# Patient Record
Sex: FEMALE | Race: WHITE | NOT HISPANIC OR LATINO | URBAN - METROPOLITAN AREA
[De-identification: names, ages, dates, MRNs, and addresses within clinical notes are randomized per-mention and may not be internally consistent; named-entity substitution may affect disease eponyms.]

---

## 2018-04-19 ENCOUNTER — INPATIENT (INPATIENT)
Facility: HOSPITAL | Age: 33
LOS: 0 days | Discharge: HOME | DRG: 812 | End: 2018-04-20
Attending: OBSTETRICS & GYNECOLOGY | Admitting: OBSTETRICS & GYNECOLOGY
Payer: COMMERCIAL

## 2018-04-19 ENCOUNTER — EMERGENCY (EMERGENCY)
Facility: HOSPITAL | Age: 33
LOS: 1 days | Discharge: HOME | End: 2018-04-19
Attending: EMERGENCY MEDICINE | Admitting: EMERGENCY MEDICINE
Payer: COMMERCIAL

## 2018-04-19 VITALS
DIASTOLIC BLOOD PRESSURE: 54 MMHG | SYSTOLIC BLOOD PRESSURE: 100 MMHG | TEMPERATURE: 98 F | HEART RATE: 76 BPM | RESPIRATION RATE: 18 BRPM | OXYGEN SATURATION: 100 %

## 2018-04-19 VITALS
RESPIRATION RATE: 18 BRPM | SYSTOLIC BLOOD PRESSURE: 68 MMHG | OXYGEN SATURATION: 100 % | HEART RATE: 69 BPM | TEMPERATURE: 98 F | DIASTOLIC BLOOD PRESSURE: 41 MMHG

## 2018-04-19 VITALS
OXYGEN SATURATION: 100 % | RESPIRATION RATE: 18 BRPM | TEMPERATURE: 99 F | HEART RATE: 86 BPM | DIASTOLIC BLOOD PRESSURE: 57 MMHG | SYSTOLIC BLOOD PRESSURE: 95 MMHG | WEIGHT: 115.08 LBS | HEIGHT: 64 IN

## 2018-04-19 DIAGNOSIS — I95.9 HYPOTENSION, UNSPECIFIED: ICD-10-CM

## 2018-04-19 DIAGNOSIS — Z97.5 PRESENCE OF (INTRAUTERINE) CONTRACEPTIVE DEVICE: Chronic | ICD-10-CM

## 2018-04-19 DIAGNOSIS — N98.1 HYPERSTIMULATION OF OVARIES: ICD-10-CM

## 2018-04-19 LAB
ALBUMIN SERPL ELPH-MCNC: 2.7 G/DL — LOW (ref 3.4–5)
ALP SERPL-CCNC: 25 U/L — LOW (ref 40–120)
ALT FLD-CCNC: 19 U/L — SIGNIFICANT CHANGE UP (ref 12–42)
ANION GAP SERPL CALC-SCNC: 8 MMOL/L — LOW (ref 9–16)
APTT BLD: 20 SEC — LOW (ref 27.5–36.5)
AST SERPL-CCNC: 35 U/L — SIGNIFICANT CHANGE UP (ref 15–37)
BASOPHILS NFR BLD AUTO: 0.1 % — SIGNIFICANT CHANGE UP (ref 0–2)
BILIRUB SERPL-MCNC: 0.3 MG/DL — SIGNIFICANT CHANGE UP (ref 0.2–1.2)
BLD GP AB SCN SERPL QL: NEGATIVE — SIGNIFICANT CHANGE UP
BUN SERPL-MCNC: 16 MG/DL — SIGNIFICANT CHANGE UP (ref 7–23)
CALCIUM SERPL-MCNC: 7.7 MG/DL — LOW (ref 8.5–10.5)
CHLORIDE SERPL-SCNC: 106 MMOL/L — SIGNIFICANT CHANGE UP (ref 96–108)
CO2 SERPL-SCNC: 23 MMOL/L — SIGNIFICANT CHANGE UP (ref 22–31)
CREAT SERPL-MCNC: 0.47 MG/DL — LOW (ref 0.5–1.3)
EOSINOPHIL NFR BLD AUTO: 0 % — SIGNIFICANT CHANGE UP (ref 0–6)
GLUCOSE SERPL-MCNC: 168 MG/DL — HIGH (ref 70–99)
HCG SERPL-ACNC: <1 MIU/ML — SIGNIFICANT CHANGE UP
HCT VFR BLD CALC: 20.1 % — CRITICAL LOW (ref 34.5–45)
HCT VFR BLD CALC: 20.5 % — CRITICAL LOW (ref 34.5–45)
HCT VFR BLD CALC: 20.7 % — CRITICAL LOW (ref 34.5–45)
HGB BLD-MCNC: 6.8 G/DL — CRITICAL LOW (ref 11.5–15.5)
HGB BLD-MCNC: 6.8 G/DL — CRITICAL LOW (ref 11.5–15.5)
HGB BLD-MCNC: 7.1 G/DL — LOW (ref 11.5–15.5)
HYPOCHROMIA BLD QL: SLIGHT — SIGNIFICANT CHANGE UP
INR BLD: 1.11 — SIGNIFICANT CHANGE UP (ref 0.88–1.16)
LACTATE SERPL-SCNC: 1 MMOL/L — SIGNIFICANT CHANGE UP (ref 0.5–2)
LG PLATELETS BLD QL AUTO: PRESENT — SIGNIFICANT CHANGE UP
LYMPHOCYTES # BLD AUTO: 6.9 % — LOW (ref 13–44)
LYMPHOCYTES # BLD AUTO: 7 % — LOW (ref 13–44)
MCHC RBC-ENTMCNC: 30.2 PG — SIGNIFICANT CHANGE UP (ref 27–34)
MCHC RBC-ENTMCNC: 31.8 PG — SIGNIFICANT CHANGE UP (ref 27–34)
MCHC RBC-ENTMCNC: 32.3 PG — SIGNIFICANT CHANGE UP (ref 27–34)
MCHC RBC-ENTMCNC: 33.2 G/DL — SIGNIFICANT CHANGE UP (ref 32–36)
MCHC RBC-ENTMCNC: 33.8 G/DL — SIGNIFICANT CHANGE UP (ref 32–36)
MCHC RBC-ENTMCNC: 34.3 G/DL — SIGNIFICANT CHANGE UP (ref 32–36)
MCV RBC AUTO: 91.1 FL — SIGNIFICANT CHANGE UP (ref 80–100)
MCV RBC AUTO: 93.9 FL — SIGNIFICANT CHANGE UP (ref 80–100)
MCV RBC AUTO: 94.1 FL — SIGNIFICANT CHANGE UP (ref 80–100)
MONOCYTES NFR BLD AUTO: 4.6 % — SIGNIFICANT CHANGE UP (ref 2–14)
NEUTROPHILS NFR BLD AUTO: 73 % — SIGNIFICANT CHANGE UP (ref 43–77)
NEUTROPHILS NFR BLD AUTO: 88.4 % — HIGH (ref 43–77)
NEUTS BAND # BLD: 20 % — HIGH
PLAT MORPH BLD: (no result)
PLATELET # BLD AUTO: 158 K/UL — SIGNIFICANT CHANGE UP (ref 150–400)
PLATELET # BLD AUTO: 162 K/UL — SIGNIFICANT CHANGE UP (ref 150–400)
PLATELET # BLD AUTO: 183 K/UL — SIGNIFICANT CHANGE UP (ref 150–400)
POTASSIUM SERPL-MCNC: 5.2 MMOL/L — SIGNIFICANT CHANGE UP (ref 3.5–5.3)
POTASSIUM SERPL-SCNC: 5.2 MMOL/L — SIGNIFICANT CHANGE UP (ref 3.5–5.3)
PROT SERPL-MCNC: 5.4 G/DL — LOW (ref 6.4–8.2)
PROTHROM AB SERPL-ACNC: 12.2 SEC — SIGNIFICANT CHANGE UP (ref 9.8–12.7)
RBC # BLD: 2.14 M/UL — LOW (ref 3.8–5.2)
RBC # BLD: 2.2 M/UL — LOW (ref 3.8–5.2)
RBC # BLD: 2.25 M/UL — LOW (ref 3.8–5.2)
RBC # FLD: 12 % — SIGNIFICANT CHANGE UP (ref 10.3–16.9)
RBC # FLD: 12.4 % — SIGNIFICANT CHANGE UP (ref 10.3–16.9)
RBC # FLD: 12.9 % — SIGNIFICANT CHANGE UP (ref 10.3–16.9)
RBC BLD AUTO: (no result)
RH IG SCN BLD-IMP: NEGATIVE — SIGNIFICANT CHANGE UP
RH IG SCN BLD-IMP: NEGATIVE — SIGNIFICANT CHANGE UP
SODIUM SERPL-SCNC: 137 MMOL/L — SIGNIFICANT CHANGE UP (ref 132–145)
WBC # BLD: 10.4 K/UL — SIGNIFICANT CHANGE UP (ref 3.8–10.5)
WBC # BLD: 11.7 K/UL — HIGH (ref 3.8–10.5)
WBC # BLD: 9.5 K/UL — SIGNIFICANT CHANGE UP (ref 3.8–10.5)
WBC # FLD AUTO: 10.4 K/UL — SIGNIFICANT CHANGE UP (ref 3.8–10.5)
WBC # FLD AUTO: 11.7 K/UL — HIGH (ref 3.8–10.5)
WBC # FLD AUTO: 9.5 K/UL — SIGNIFICANT CHANGE UP (ref 3.8–10.5)

## 2018-04-19 PROCEDURE — 76775 US EXAM ABDO BACK WALL LIM: CPT | Mod: 26

## 2018-04-19 PROCEDURE — 71045 X-RAY EXAM CHEST 1 VIEW: CPT | Mod: 26

## 2018-04-19 PROCEDURE — 93010 ELECTROCARDIOGRAM REPORT: CPT

## 2018-04-19 PROCEDURE — 76705 ECHO EXAM OF ABDOMEN: CPT | Mod: 26

## 2018-04-19 PROCEDURE — 99291 CRITICAL CARE FIRST HOUR: CPT

## 2018-04-19 PROCEDURE — 76856 US EXAM PELVIC COMPLETE: CPT | Mod: 26

## 2018-04-19 PROCEDURE — 99285 EMERGENCY DEPT VISIT HI MDM: CPT | Mod: 25

## 2018-04-19 RX ORDER — HYDROMORPHONE HYDROCHLORIDE 2 MG/ML
0.5 INJECTION INTRAMUSCULAR; INTRAVENOUS; SUBCUTANEOUS ONCE
Qty: 0 | Refills: 0 | Status: DISCONTINUED | OUTPATIENT
Start: 2018-04-19 | End: 2018-04-19

## 2018-04-19 RX ORDER — SODIUM CHLORIDE 9 MG/ML
1000 INJECTION, SOLUTION INTRAVENOUS
Qty: 0 | Refills: 0 | Status: DISCONTINUED | OUTPATIENT
Start: 2018-04-19 | End: 2018-04-20

## 2018-04-19 RX ORDER — MORPHINE SULFATE 50 MG/1
4 CAPSULE, EXTENDED RELEASE ORAL
Qty: 0 | Refills: 0 | Status: DISCONTINUED | OUTPATIENT
Start: 2018-04-19 | End: 2018-04-20

## 2018-04-19 RX ORDER — ONDANSETRON 8 MG/1
2 TABLET, FILM COATED ORAL ONCE
Qty: 0 | Refills: 0 | Status: DISCONTINUED | OUTPATIENT
Start: 2018-04-19 | End: 2018-04-20

## 2018-04-19 RX ORDER — ACETAMINOPHEN 500 MG
1000 TABLET ORAL ONCE
Qty: 0 | Refills: 0 | Status: COMPLETED | OUTPATIENT
Start: 2018-04-19 | End: 2018-04-19

## 2018-04-19 RX ORDER — SODIUM CHLORIDE 9 MG/ML
1000 INJECTION INTRAMUSCULAR; INTRAVENOUS; SUBCUTANEOUS ONCE
Qty: 0 | Refills: 0 | Status: COMPLETED | OUTPATIENT
Start: 2018-04-19 | End: 2018-04-19

## 2018-04-19 RX ORDER — CEFTRIAXONE 500 MG/1
1 INJECTION, POWDER, FOR SOLUTION INTRAMUSCULAR; INTRAVENOUS ONCE
Qty: 0 | Refills: 0 | Status: DISCONTINUED | OUTPATIENT
Start: 2018-04-19 | End: 2018-04-23

## 2018-04-19 RX ADMIN — Medication 1000 MILLIGRAM(S): at 20:37

## 2018-04-19 RX ADMIN — SODIUM CHLORIDE 1000 MILLILITER(S): 9 INJECTION INTRAMUSCULAR; INTRAVENOUS; SUBCUTANEOUS at 16:17

## 2018-04-19 RX ADMIN — HYDROMORPHONE HYDROCHLORIDE 0.5 MILLIGRAM(S): 2 INJECTION INTRAMUSCULAR; INTRAVENOUS; SUBCUTANEOUS at 20:37

## 2018-04-19 RX ADMIN — SODIUM CHLORIDE 500 MILLILITER(S): 9 INJECTION INTRAMUSCULAR; INTRAVENOUS; SUBCUTANEOUS at 19:00

## 2018-04-19 RX ADMIN — Medication 400 MILLIGRAM(S): at 20:36

## 2018-04-19 RX ADMIN — HYDROMORPHONE HYDROCHLORIDE 0.5 MILLIGRAM(S): 2 INJECTION INTRAMUSCULAR; INTRAVENOUS; SUBCUTANEOUS at 20:28

## 2018-04-19 NOTE — ED CLERICAL - NS ED CLERK NOTE PRE-ARRIVAL INFORMATION; ADDITIONAL PRE-ARRIVAL INFORMATION
shirin alvarez  -s/p egg retieval today--- dr almonte to accept--  GYN-- hypotension/sob- 500 cc fluid in abdomen -Ovarian hyperstim syndrome- (YAMILA)

## 2018-04-19 NOTE — H&P ADULT - NSHPLABSRESULTS_GEN_ALL_CORE
LABS:                        6.8    11.7  )-----------( 162      ( 19 Apr 2018 22:42 )             20.1     04-19    137  |  106  |  16  ----------------------------<  168<H>  5.2   |  23  |  0.47<L>    Ca    7.7<L>      19 Apr 2018 16:25    TPro  5.4<L>  /  Alb  2.7<L>  /  TBili  0.3  /  DBili  x   /  AST  35  /  ALT  19  /  AlkPhos  25<L>  04-19    PT/INR - ( 19 Apr 2018 16:25 )   PT: 12.2 sec;   INR: 1.11          PTT - ( 19 Apr 2018 16:25 )  PTT:20.0 sec      RADIOLOGY & ADDITIONAL STUDIES:   pending report of TVUS    < from: Xray Chest 1 View AP/PA (04.19.18 @ 16:33) >      EXAM:  XR CHEST AP OR PA 1V                           PROCEDURE DATE:  04/19/2018                     INTERPRETATION:  PORTABLE CHEST X-RAY     HISTORY: ascites pleural effusion    PRIOR STUDIES: No prior studies are available for comparison.    FINDINGS: The lungs are clear.  There are no pleural effusions.  The   cardiomediastinal silhouette, bones and soft tissues are unremarkable.    IMPRESSION:  Unremarkable            "Thank you for the opportunity to participate in the care of this   patient."        BECK DOTSON M.D., ATTENDING RADIOLOGIST  This document has been electronically signed.  Apr 19 2018  4:33PM             < end of copied text >

## 2018-04-19 NOTE — PROGRESS NOTE ADULT - SUBJECTIVE AND OBJECTIVE BOX
Patient presented to -ER [facility] reporting increased dizzyness, syncope and abdominal pain following VOR this morning.  Patient was assessed at ER and transferred to [facility] for further assessment and management.      Vital Signs Last 24 Hrs  T(C): 37.4 (19 Apr 2018 22:35), Max: 37.4 (19 Apr 2018 22:35)  T(F): 99.4 (19 Apr 2018 22:35), Max: 99.4 (19 Apr 2018 22:35)  HR: 95 (19 Apr 2018 22:35) (69 - 103)  BP: 98/60 (19 Apr 2018 22:35) (68/41 - 103/58)  BP(mean): --  RR: 18 (19 Apr 2018 22:35) (17 - 18)  SpO2: 98% (19 Apr 2018 22:35) (98% - 100%)    Gen: pale, alert, responsive/conversational  CV: RRR no m/r/g  Lungs: CTAb no w/r/r  Abd: +distended, +tender to palpation, +voluntary guarding    CBC@16:25: Hb/Hct 7.1/20.7,   CBC@19:46: Hb/Hct 6.8/20.5,   CBC@22:42: Hb/Hct 6.8/20.1,

## 2018-04-19 NOTE — ED PROVIDER NOTE - MEDICAL DECISION MAKING DETAILS
abd pain sp egg retrieval, anemia, no reported bleeding, tender on exam, no hx of PE, will rpt labs, GYN eval, US

## 2018-04-19 NOTE — ED ADULT NURSE REASSESSMENT NOTE - NS ED NURSE REASSESS COMMENT FT1
Pt resting on stretcher AAO x 3 speech is clear and coherent breathing even and unlabored. IV solution NS infusing bolus. awaiting ultrasound

## 2018-04-19 NOTE — H&P ADULT - NSHPPHYSICALEXAM_GEN_ALL_CORE
PHYSICAL EXAM:   Vital Signs Last 24 Hrs  T(C): 37.4 (19 Apr 2018 22:35), Max: 37.4 (19 Apr 2018 22:35)  T(F): 99.4 (19 Apr 2018 22:35), Max: 99.4 (19 Apr 2018 22:35)  HR: 95 (19 Apr 2018 22:35) (69 - 103)  BP: 98/60 (19 Apr 2018 22:35) (68/41 - 103/58)  BP(mean): --  RR: 18 (19 Apr 2018 22:35) (17 - 18)  SpO2: 98% (19 Apr 2018 22:35) (98% - 100%)    **************************  Constitutional: Alert & Oriented x3, No acute distress, cooperative   Respiratory: Clear to ausculation bilaterally; no wheezing, rhonchi, or crackles  Cardiovascular: S1 &S2 physiologic, no murmurs, or gallops  Gastrointestinal: soft, tender in all 4 quadrants, positive bowel sounds, rebound and guarding in both LLQ and RLQ  Speculum exam: pt deferred due to pain at that time  Extremities: no calf tenderness or swelling

## 2018-04-19 NOTE — H&P ADULT - HISTORY OF PRESENT ILLNESS
33y G0 presenting to ED with acute severe abdominal pain following egg retrieval at [facility] this afternoon. Pt states that she returned home and had a syncopal episode due to the pain she was having. Her mother was there and was able to catch her before she fell. Pt indicates that her pain is lower abdominal and sharp in nature. She denies f/c/n/v. Pt has a hx of PCO and felt really bloated throughout the stimulation period. She has a copper IUD in place. Denies any vaginal bleeding currently. During procedure pt had 60ish eggs retrieved.       GYNHx: G0 denies any abnormal pap smears, STDs.   morphine  - Injectable 4 milliGRAM(s) IV Push every 15 minutes PRN Severe Pain  ondansetron Injectable 2 milliGRAM(s) IV Push once PRN Nausea and/or Vomiting      No Known Allergies

## 2018-04-19 NOTE — ED PROVIDER NOTE - DIAGNOSTIC INTERPRETATION
Bedside abdominal/pelvic US positive for fluid on FAST. Bedside abdominal/pelvic US positive for fluid on FAST.    CXR no acute infiltrate, heart shadow normal, bony structures intact

## 2018-04-19 NOTE — ED PROVIDER NOTE - PHYSICAL EXAMINATION
CON: ao x 3, HENMT: clear oropharynx, soft neck, HEAD: atraumatic, CV: rrr, equal pulses b/l, RESP: cta b/l, GI: +BS, soft, tender lower abd, no rebound, no guarding, : pelvic exam deferred to GYN team (at bedside during eval), SKIN: no rash, MSK: no deformities, NEURO: no gross motor or sensory deficit CON: ao x 3, HENMT: clear oropharynx, soft neck, HEAD: atraumatic, CV: rrr, equal pulses b/l, RESP: cta b/l, GI: +BS, soft, tender diffusely lower>upper, +guarding, +rebound, : pelvic exam deferred to GYN team (at bedside during eval), SKIN: no rash, MSK: no deformities, NEURO: no gross motor or sensory deficit

## 2018-04-19 NOTE — ED ADULT NURSE NOTE - OBJECTIVE STATEMENT
BIBA for hypotension, near-syncope s/p egg extraction procedure . Pt taken directly to room ,IV access with fluids ongoing to left AC from EMS< provider at bedside for eval and KARLY. Pt placed on cardiac/ respiratory monitoring. fall precautions, mother at bedside, will monitor.

## 2018-04-19 NOTE — ED ADULT TRIAGE NOTE - OTHER COMPLAINTS
Patient transferred from [facility] for OB/GYN consult. Patient reports multiple syncopal episodes today.

## 2018-04-19 NOTE — ED PROVIDER NOTE - OBJECTIVE STATEMENT
34 y/o female with PMHx of IUD in place presents to ED for hypotension. Patient reports she was at the [facility] clinic at 8AM today to have her eggs extracted. Was asymptomatic at the time but began experiencing cramping abd pain after eating, different from pain during period, that worsened when breathing and fainted 3x. EMS was called and noted she was hypotensive at the time, and administered fluids. She states that her BP was low when measured at the office before the extraction, but was otherwise normal. Patient took hormonal injections in preparation for the extraction, with the last injection being this past Tuesday. She did not take any meds PTA and denies allergies to any meds.

## 2018-04-19 NOTE — ED ADULT NURSE NOTE - OBJECTIVE STATEMENT
biba as transfer from [facility] for abdominal pain and was told she has "fluid in her belly" s/p egg retrieval procedure earlier this morning.  IV access, NS bolus in place.  Patient remains A&Ox4 and stable condition.  Denies vaginal bleeding, dizziness, sob. Labs sent.  Awaiting results and further studies.  Continue to monitor.

## 2018-04-19 NOTE — ED PROVIDER NOTE - OBJECTIVE STATEMENT
33 yof pw pelvic pain, sp egg retrieval today.  passed out today from pain.  seen at [facility], xray and transabd US done, w/ free fluid, but no air under diaphragm, also noted anemia to 7, preop labs 12, pt denies bleeding.  currently feels improved.  pelvic rad to R shoulder.

## 2018-04-19 NOTE — ED PROVIDER NOTE - PROGRESS NOTE DETAILS
us tech called at 825pm, requested to have portable US done as pt w/ abd pain and anemia, though hemodynamically at the moment, US should be done in the ED while being monitored unable to access exit writer, generic consent form obtained, risks and benefits explained, pt actively undergoing US procedure, mother is present at bedside w/ pt and consented w/ daughter's presence

## 2018-04-19 NOTE — PROGRESS NOTE ADULT - ASSESSMENT
33  hx PCOS s/p VOR @8am 2018 for elective oocyte cryopreservation (65 oocytes retrieved) with hemoperitoneum.  1.  transfuse 2u pRBCs  2.  monitor CBC  3.  monitor I/O, morse catheter  4.  pain management  5.  discussed with patient and her mother value of diagnostic laparoscopy and risks associated with this procedure, including but not limited to infection, damage to organs and bleeding.  Discussed that surgical evaluation is not without risk and given current status, recommend continued observation with transfusion.  Discussed that acute event may have ended and that bleeding may have stopped, but that we will continue to monitor patient progress, and that surgical intervention may still be needed.  EDF

## 2018-04-19 NOTE — ED PROVIDER NOTE - PROGRESS NOTE DETAILS
GYN attending paged x 3 no answer as yet contacted GYN attending DR SEWELL, [facility] transfer to [facility] ER for GYN evaluation. accepted by DR DRISCOLL for ER to ER transfer.

## 2018-04-19 NOTE — ED ADULT NURSE REASSESSMENT NOTE - NS ED NURSE REASSESS COMMENT FT1
pt to be transferred to [facility] ER for further eval and treatment by OBGYN [facility] She[facility]. Noted improvement in vitals, see flow sheet, will monitor and f/u as indicated.
Pt given stat dose of Ceftriaxone IVPB just prior to transport for bandemia as per Dr. Vazquez, will run on the IV pump from EMS

## 2018-04-19 NOTE — H&P ADULT - ASSESSMENT
33 G0 s/p egg retrieval with acute pelvic pain and downtrending Hg. Pt currently receiving 2U pRBCs STAT  - Pt hypotensive w/ intermittent tachycardia concerning for active bleeding. Cheif resident at bedside with pt  - Plan for possible diagnostic laparoscopy or transfusion and observation  - Per attending observation for now in tele unit  - Acute blood loss due to either procedure, ruptured ovarian cyst or active bleeding.     d/w Dr. Rutledge

## 2018-04-20 VITALS
DIASTOLIC BLOOD PRESSURE: 50 MMHG | SYSTOLIC BLOOD PRESSURE: 96 MMHG | OXYGEN SATURATION: 100 % | HEART RATE: 74 BPM | RESPIRATION RATE: 17 BRPM

## 2018-04-20 LAB
ALBUMIN SERPL ELPH-MCNC: 3.2 G/DL — LOW (ref 3.3–5)
ALP SERPL-CCNC: 23 U/L — LOW (ref 40–120)
ALT FLD-CCNC: 8 U/L — LOW (ref 10–45)
ANION GAP SERPL CALC-SCNC: 10 MMOL/L — SIGNIFICANT CHANGE UP (ref 5–17)
AST SERPL-CCNC: 14 U/L — SIGNIFICANT CHANGE UP (ref 10–40)
BILIRUB SERPL-MCNC: 0.9 MG/DL — SIGNIFICANT CHANGE UP (ref 0.2–1.2)
BUN SERPL-MCNC: 12 MG/DL — SIGNIFICANT CHANGE UP (ref 7–23)
CALCIUM SERPL-MCNC: 7.4 MG/DL — LOW (ref 8.4–10.5)
CHLORIDE SERPL-SCNC: 105 MMOL/L — SIGNIFICANT CHANGE UP (ref 96–108)
CO2 SERPL-SCNC: 19 MMOL/L — LOW (ref 22–31)
CREAT SERPL-MCNC: 0.45 MG/DL — LOW (ref 0.5–1.3)
GLUCOSE SERPL-MCNC: 96 MG/DL — SIGNIFICANT CHANGE UP (ref 70–99)
HCT VFR BLD CALC: 22.6 % — LOW (ref 34.5–45)
HCT VFR BLD CALC: 26 % — LOW (ref 34.5–45)
HCT VFR BLD CALC: 26.3 % — LOW (ref 34.5–45)
HGB BLD-MCNC: 7.7 G/DL — LOW (ref 11.5–15.5)
HGB BLD-MCNC: 8.9 G/DL — LOW (ref 11.5–15.5)
HGB BLD-MCNC: 9.1 G/DL — LOW (ref 11.5–15.5)
MCHC RBC-ENTMCNC: 30.5 PG — SIGNIFICANT CHANGE UP (ref 27–34)
MCHC RBC-ENTMCNC: 30.7 PG — SIGNIFICANT CHANGE UP (ref 27–34)
MCHC RBC-ENTMCNC: 31.1 PG — SIGNIFICANT CHANGE UP (ref 27–34)
MCHC RBC-ENTMCNC: 34.1 G/DL — SIGNIFICANT CHANGE UP (ref 32–36)
MCHC RBC-ENTMCNC: 34.2 G/DL — SIGNIFICANT CHANGE UP (ref 32–36)
MCHC RBC-ENTMCNC: 34.6 G/DL — SIGNIFICANT CHANGE UP (ref 32–36)
MCV RBC AUTO: 89 FL — SIGNIFICANT CHANGE UP (ref 80–100)
MCV RBC AUTO: 89.8 FL — SIGNIFICANT CHANGE UP (ref 80–100)
MCV RBC AUTO: 90 FL — SIGNIFICANT CHANGE UP (ref 80–100)
PLATELET # BLD AUTO: 128 K/UL — LOW (ref 150–400)
PLATELET # BLD AUTO: 133 K/UL — LOW (ref 150–400)
PLATELET # BLD AUTO: 145 K/UL — LOW (ref 150–400)
POTASSIUM SERPL-MCNC: 4.2 MMOL/L — SIGNIFICANT CHANGE UP (ref 3.5–5.3)
POTASSIUM SERPL-SCNC: 4.2 MMOL/L — SIGNIFICANT CHANGE UP (ref 3.5–5.3)
PROT SERPL-MCNC: 5.1 G/DL — LOW (ref 6–8.3)
RBC # BLD: 2.51 M/UL — LOW (ref 3.8–5.2)
RBC # BLD: 2.92 M/UL — LOW (ref 3.8–5.2)
RBC # BLD: 2.93 M/UL — LOW (ref 3.8–5.2)
RBC # FLD: 13.8 % — SIGNIFICANT CHANGE UP (ref 10.3–16.9)
RBC # FLD: 14.3 % — SIGNIFICANT CHANGE UP (ref 10.3–16.9)
RBC # FLD: 14.6 % — SIGNIFICANT CHANGE UP (ref 10.3–16.9)
SODIUM SERPL-SCNC: 134 MMOL/L — LOW (ref 135–145)
WBC # BLD: 10.8 K/UL — HIGH (ref 3.8–10.5)
WBC # BLD: 13.3 K/UL — HIGH (ref 3.8–10.5)
WBC # BLD: 9.8 K/UL — SIGNIFICANT CHANGE UP (ref 3.8–10.5)
WBC # FLD AUTO: 10.8 K/UL — HIGH (ref 3.8–10.5)
WBC # FLD AUTO: 13.3 K/UL — HIGH (ref 3.8–10.5)
WBC # FLD AUTO: 9.8 K/UL — SIGNIFICANT CHANGE UP (ref 3.8–10.5)

## 2018-04-20 PROCEDURE — 96375 TX/PRO/DX INJ NEW DRUG ADDON: CPT

## 2018-04-20 PROCEDURE — 85027 COMPLETE CBC AUTOMATED: CPT

## 2018-04-20 PROCEDURE — 86900 BLOOD TYPING SEROLOGIC ABO: CPT

## 2018-04-20 PROCEDURE — 96374 THER/PROPH/DIAG INJ IV PUSH: CPT

## 2018-04-20 PROCEDURE — 83605 ASSAY OF LACTIC ACID: CPT

## 2018-04-20 PROCEDURE — 85610 PROTHROMBIN TIME: CPT

## 2018-04-20 PROCEDURE — P9016: CPT

## 2018-04-20 PROCEDURE — 36430 TRANSFUSION BLD/BLD COMPNT: CPT

## 2018-04-20 PROCEDURE — 86850 RBC ANTIBODY SCREEN: CPT

## 2018-04-20 PROCEDURE — 99285 EMERGENCY DEPT VISIT HI MDM: CPT | Mod: 25

## 2018-04-20 PROCEDURE — 86923 COMPATIBILITY TEST ELECTRIC: CPT

## 2018-04-20 PROCEDURE — 76775 US EXAM ABDO BACK WALL LIM: CPT

## 2018-04-20 PROCEDURE — 86901 BLOOD TYPING SEROLOGIC RH(D): CPT

## 2018-04-20 PROCEDURE — 80053 COMPREHEN METABOLIC PANEL: CPT

## 2018-04-20 PROCEDURE — 76856 US EXAM PELVIC COMPLETE: CPT

## 2018-04-20 PROCEDURE — 85730 THROMBOPLASTIN TIME PARTIAL: CPT

## 2018-04-20 PROCEDURE — 85025 COMPLETE CBC W/AUTO DIFF WBC: CPT

## 2018-04-20 PROCEDURE — 36415 COLL VENOUS BLD VENIPUNCTURE: CPT

## 2018-04-20 RX ORDER — ACETAMINOPHEN 500 MG
1000 TABLET ORAL ONCE
Qty: 0 | Refills: 0 | Status: COMPLETED | OUTPATIENT
Start: 2018-04-20 | End: 2018-04-20

## 2018-04-20 RX ORDER — SODIUM CHLORIDE 9 MG/ML
1000 INJECTION INTRAMUSCULAR; INTRAVENOUS; SUBCUTANEOUS
Qty: 0 | Refills: 0 | Status: DISCONTINUED | OUTPATIENT
Start: 2018-04-20 | End: 2018-04-20

## 2018-04-20 RX ORDER — ONDANSETRON 8 MG/1
8 TABLET, FILM COATED ORAL EVERY 8 HOURS
Qty: 0 | Refills: 0 | Status: DISCONTINUED | OUTPATIENT
Start: 2018-04-20 | End: 2018-04-20

## 2018-04-20 RX ORDER — ACETAMINOPHEN 500 MG
650 TABLET ORAL EVERY 6 HOURS
Qty: 0 | Refills: 0 | Status: DISCONTINUED | OUTPATIENT
Start: 2018-04-20 | End: 2018-04-20

## 2018-04-20 RX ORDER — IBUPROFEN 200 MG
600 TABLET ORAL EVERY 6 HOURS
Qty: 0 | Refills: 0 | Status: DISCONTINUED | OUTPATIENT
Start: 2018-04-20 | End: 2018-04-20

## 2018-04-20 RX ORDER — HYDROMORPHONE HYDROCHLORIDE 2 MG/ML
0.5 INJECTION INTRAMUSCULAR; INTRAVENOUS; SUBCUTANEOUS EVERY 4 HOURS
Qty: 0 | Refills: 0 | Status: DISCONTINUED | OUTPATIENT
Start: 2018-04-20 | End: 2018-04-20

## 2018-04-20 RX ADMIN — Medication 1000 MILLIGRAM(S): at 10:41

## 2018-04-20 RX ADMIN — Medication 600 MILLIGRAM(S): at 14:00

## 2018-04-20 RX ADMIN — Medication 600 MILLIGRAM(S): at 18:34

## 2018-04-20 RX ADMIN — Medication 400 MILLIGRAM(S): at 09:35

## 2018-04-20 RX ADMIN — Medication 600 MILLIGRAM(S): at 12:58

## 2018-04-20 RX ADMIN — SODIUM CHLORIDE 100 MILLILITER(S): 9 INJECTION INTRAMUSCULAR; INTRAVENOUS; SUBCUTANEOUS at 03:15

## 2018-04-20 RX ADMIN — HYDROMORPHONE HYDROCHLORIDE 0.5 MILLIGRAM(S): 2 INJECTION INTRAMUSCULAR; INTRAVENOUS; SUBCUTANEOUS at 07:50

## 2018-04-20 RX ADMIN — Medication 650 MILLIGRAM(S): at 15:13

## 2018-04-20 NOTE — PROGRESS NOTE ADULT - SUBJECTIVE AND OBJECTIVE BOX
Pt states that she feels better, but continues to have difficulty taking deep breaths due to abdominal pain on deep inspiration.  She has declined narcotic medication.    Vital Signs Last 24 Hrs  T(C): 36.8 (20 Apr 2018 05:30), Max: 37.4 (19 Apr 2018 22:35)  T(F): 98.3 (20 Apr 2018 05:30), Max: 99.4 (19 Apr 2018 22:35)  HR: 76 (20 Apr 2018 05:30) (69 - 103)  BP: 92/53 (20 Apr 2018 05:30) (68/41 - 103/58)  BP(mean): 66 (20 Apr 2018 05:30) (66 - 66)  RR: 18 (20 Apr 2018 05:30) (17 - 18)  SpO2: 99% (20 Apr 2018 05:30) (98% - 100%)                        8.9    10.8  )-----------( 128      ( 20 Apr 2018 06:24 )             26.0       UO: approx 335mL in morse bag (>40mL/hr)

## 2018-04-20 NOTE — PROGRESS NOTE ADULT - ASSESSMENT
33 G0 s/p egg retrieval with acute pelvic pain, concern for OHSS and acute postoperative bleeding. Pt currently s/p 2U pRBCs with appropriate response. Patient is currently clinically stable.    - VSS per routine  - plan to d/c morse & advance diet  - Transition to PO pain medications  - At this time, no indication for acute surgical management.;

## 2018-04-20 NOTE — PROGRESS NOTE ADULT - ASSESSMENT
33 G0 s/p egg retrieval with acute pelvic pain and downtrending Hg on admission. Pt currently s/p 2U pRBCs with appropriate rise after first unit.   - Vital signs continue to show hypotension but tachycardia has resolved  - Pt continues to endorse pain and shows rebound/guarding diffusely  - f/u AM CBC for Hg trend. BMP and Coags also sent  - Continue strict I&Os  - NPO for possible addon today if symptoms resolve or pts status continues to be critical

## 2018-04-20 NOTE — PROGRESS NOTE ADULT - SUBJECTIVE AND OBJECTIVE BOX
Appropriate increase in Hb from 6.7 yo 7.7 after 1u pRBC transfusion.   Patient seen at bedside in ER holding, awaiting next available telemetry bed.   BP 90s/50s, HR 70s. Kennedy in situ for strict I/Os; output clear ~80cc over last hour.   Exam unchanged with distended, diffusely tender abdomen +rebound +guarding.   D/w Dr. Rutledge. Will transfuse with 2nd unit now and f/u post-transfusion CBC.  Patient will remain NPO with IV fluids, serial exams, and analgesics as needed. Monitoring closely.

## 2018-04-20 NOTE — DISCHARGE NOTE ADULT - PATIENT PORTAL LINK FT
You can access the [facility] Patient Portal, offered by [facility], by registering with the following website: [email or web address]

## 2018-04-20 NOTE — DISCHARGE NOTE ADULT - PLAN OF CARE
Recovery Pelvic rest (nothing in vagina) x4 weeks. Schedule follow up appointment with Dr. Rutledge in 1-2 weeks. Go to nearest ED if fever >100.4, severe abdominal pain or heavy vaginal bleeding.

## 2018-04-20 NOTE — PROGRESS NOTE ADULT - ASSESSMENT
A/P: 33  with hemoperitoneum s/p VOR for elective oocyte cryopreservation  1.  continue normal diet  2.  prepare for discharge home  3.  discussed symptoms of OHSS, reviewed precautions  4.  continue Tylenol, Motrin as needed for pain relief  EDF

## 2018-04-20 NOTE — PROGRESS NOTE ADULT - SUBJECTIVE AND OBJECTIVE BOX
Pt evaluated at bedside.  She reports she is still having pain and it has not decreased at all. She is still unable to move but does not wish to take narcotics.   She denies HA, dizzines, SOB, CP, palpitations, n/v. Kennedy insitu with adequate urine output.     T(C): 36.8 (04-20-18 @ 05:30), Max: 37.4 (04-19-18 @ 22:35)  HR: 76 (04-20-18 @ 05:30) (76 - 103)  BP: 92/53 (04-20-18 @ 05:30) (92/52 - 102/55)  RR: 18 (04-20-18 @ 05:30) (17 - 18)  SpO2: 99% (04-20-18 @ 05:30) (98% - 100%)  GA: uncomfortable but A&Ox3  CV: RRR, no MRG  Pulm: CTAB  Abd: +BS, soft, tender and distended (same as prior exam at 1am), + rebound and guarding in all quadrants  Extrem: SCDs in place  Kennedy: clear urine output    04-19 @ 07:01  -  04-20 @ 06:27  --------------------------------------------------------  IN: 0 mL / OUT: 350 mL / NET: -350 mL                              7.7    13.3  )-----------( 145      ( 20 Apr 2018 01:42 )             22.6     04-19    137  |  106  |  16  ----------------------------<  168<H>  5.2   |  23  |  0.47<L>    Ca    7.7<L>      19 Apr 2018 16:25    TPro  5.4<L>  /  Alb  2.7<L>  /  TBili  0.3  /  DBili  x   /  AST  35  /  ALT  19  /  AlkPhos  25<L>  04-19

## 2018-04-20 NOTE — DISCHARGE NOTE ADULT - HOSPITAL COURSE
32yo admitted for symptomatic anemia s/p egg retrieval. Pt received 2u PRBC for acute blood loss anemia and hemoglobin increased appropriately. Pt is hemodynamically stable at time of discharge, tolerating regular diet.

## 2018-04-20 NOTE — DISCHARGE NOTE ADULT - CARE PLAN
Principal Discharge DX:	Anemia  Goal:	Recovery  Assessment and plan of treatment:	Pelvic rest (nothing in vagina) x4 weeks. Schedule follow up appointment with Dr. Rutledge in 1-2 weeks. Go to nearest ED if fever >100.4, severe abdominal pain or heavy vaginal bleeding.

## 2018-04-20 NOTE — PROGRESS NOTE ADULT - SUBJECTIVE AND OBJECTIVE BOX
Patient states that she is feeling better and has an easier time taking deep breaths, although some discomfort remains when doing so.  She reports that she was able to get out of bed without assistance and ambulate without assistance, without feeling dizzy.  She feels comfortable using Motrin and Tylenol for pain relief.  She states she was able to urinate without difficulty after removal of the morse catheter.    Vital Signs Last 24 Hrs  T(C): 37 (20 Apr 2018 17:00), Max: 37.4 (19 Apr 2018 22:35)  T(F): 98.6 (20 Apr 2018 17:00), Max: 99.4 (19 Apr 2018 22:35)  HR: 74 (20 Apr 2018 17:09) (70 - 103)  BP: 96/50 (20 Apr 2018 17:09) (91/52 - 102/55)  BP(mean): 69 (20 Apr 2018 17:09) (66 - 73)  RR: 17 (20 Apr 2018 17:09) (16 - 18)  SpO2: 100% (20 Apr 2018 17:09) (97% - 100%)    Gen: alert, responsive, does not appear to be in acute distress  Lungs: CTAb no w/r/r  CV: RRR, no m/r/g  Abd: soft, distended, tender to deep palpation, no guarding or rebound tenderness                          9.1    9.8   )-----------( 133      ( 20 Apr 2018 10:48 )             26.3

## 2018-04-20 NOTE — PROGRESS NOTE ADULT - ASSESSMENT
33  hx PCOS s/p VOR () with hemoperitoneum s/p transfusion 2u pRBC  1.  monitor CBC  2.  monitor urine output  3.  pain management  EDF

## 2018-04-20 NOTE — DISCHARGE NOTE ADULT - CARE PROVIDER_API CALL
Arun Rutledge), Obstetrics and Gynecology; Reproductive EndoInfertility  [address] [place] [address], [place] [facility]  Phone: [phone]  Fax: [phone]

## 2018-04-20 NOTE — PROGRESS NOTE ADULT - SUBJECTIVE AND OBJECTIVE BOX
GYN Progress Note    Patient seen at bedside.  Pain controlled on IV Tylenol  NPO for now with mild nausea  OOB to the hallway, Marcia in place    Denies CP, palpitations, SOB, fever, chills, nausea, vomiting.    Vital Signs Last 24 Hrs  T(C): 37 (20 Apr 2018 09:00), Max: 37.4 (19 Apr 2018 22:35)  T(F): 98.6 (20 Apr 2018 09:00), Max: 99.4 (19 Apr 2018 22:35)  HR: 76 (20 Apr 2018 08:12) (69 - 103)  BP: 98/56 (20 Apr 2018 08:12) (68/41 - 103/58)  BP(mean): 73 (20 Apr 2018 08:12) (66 - 73)  RR: 17 (20 Apr 2018 08:12) (17 - 18)  SpO2: 97% (20 Apr 2018 08:12) (97% - 100%)    Physical Exam:  Gen: No Acute Distress  Pulm: Normal work of breathing  GI: soft, appropriately tender, mildly distended, no rebound, no guarding.  Ext: SCDs in place, wnl    I&O's Summary    19 Apr 2018 07:01  -  20 Apr 2018 07:00  --------------------------------------------------------  IN: 300 mL / OUT: 350 mL / NET: -50 mL    20 Apr 2018 07:01  -  20 Apr 2018 12:43  --------------------------------------------------------  IN: 500 mL / OUT: 900 mL / NET: -400 mL      MEDICATIONS  (STANDING):  acetaminophen   Tablet. 650 milliGRAM(s) Oral every 6 hours  ibuprofen  Tablet 600 milliGRAM(s) Oral every 6 hours  sodium chloride 0.9%. 1000 milliLiter(s) (100 mL/Hr) IV Continuous <Continuous>    MEDICATIONS  (PRN):  ondansetron Injectable 2 milliGRAM(s) IV Push once PRN Nausea and/or Vomiting      LABS:                        9.1    9.8   )-----------( 133      ( 20 Apr 2018 10:48 )             26.3     04-20    134<L>  |  105  |  12  ----------------------------<  96  4.2   |  19<L>  |  0.45<L>    Ca    7.4<L>      20 Apr 2018 06:25    TPro  5.1<L>  /  Alb  3.2<L>  /  TBili  0.9  /  DBili  x   /  AST  14  /  ALT  8<L>  /  AlkPhos  23<L>  04-20    PT/INR - ( 19 Apr 2018 16:25 )   PT: 12.2 sec;   INR: 1.11          PTT - ( 19 Apr 2018 16:25 )  PTT:20.0 sec

## 2018-04-24 DIAGNOSIS — I95.9 HYPOTENSION, UNSPECIFIED: ICD-10-CM

## 2018-04-24 DIAGNOSIS — R10.2 PELVIC AND PERINEAL PAIN: ICD-10-CM

## 2018-04-24 DIAGNOSIS — D62 ACUTE POSTHEMORRHAGIC ANEMIA: ICD-10-CM

## 2021-09-01 NOTE — DISCHARGE NOTE ADULT - CAREGIVER ADDRESS
[address] [address] Purse String (Intermediate) Text: Given the location of the defect and the characteristics of the surrounding skin a purse string intermediate closure was deemed most appropriate.  Undermining was performed circumfirentially around the surgical defect.  A purse string suture was then placed and tightened.

## 2021-11-17 NOTE — ED ADULT NURSE NOTE - NSWEIGHTCALCTOOLDRUG_GEN_A_CORE
[FreeTextEntry1] : Pt evaluated today for a f.u visit.\par LV started on cyclobenzaprine 5mg daily at bedtime, meloxicam 15mg daily. Stopped the medications as it was causing SOB?\par Tried tizanidine, duloxetine- no longer helping. \par Today has polyarthralgias, myalgia, fatigue, poor sleep. No swelling to the joints. \par Symptoms start in AM and lasts the whole day. Worse with activity  used

## 2025-06-27 NOTE — ED ADULT NURSE NOTE - DRUG PRE-SCREENING (DAST -1)
[FreeTextEntry1] : 32F h/o SLE, lupus nephritis, splenic infarct in March 2025 presented to ED 6/24/25 with L sided abdominal pain. States the pain began in left shoulder yesterday but today was most notable in LUQ with ongoing radiation to left shoulder. Denies falls/trauma. States feels similar to when the pain of her splenic infarct began in March. No clear association with food/drink. No n/v/d though has not had appetite throughout today. No exertional component. Has been on Eliquis since the admission for her infarct for which she is compliant.   ACC: [id number]     EXAM:  CT ABDOMEN AND PELVIS IC   ORDERED BY: KAILEY LE  PROCEDURE DATE:  06/24/2025    INTERPRETATION:  CLINICAL INFORMATION: Left upper quadrant pain  COMPARISON: CT abdomen 5/10/2025, CT abdomen pelvis 3/25/2025, pelvic ultrasound 7/12/2023.  CONTRAST/COMPLICATIONS: IV Contrast: Omnipaque 350  95 cc administered   5 cc discarded Oral Contrast: NONE.  PROCEDURE: CT of the Abdomen and Pelvis was performed. Sagittal and coronal reformats were performed.  FINDINGS: LOWER CHEST: Within normal limits.  LIVER: Within normal limits. BILE DUCTS: Normal caliber. GALLBLADDER: Cholecystectomy. SPLEEN: Multiple wedge-shaped regions of hypoattenuation in the spleen. A region at the inferior pole of the spleen was previously seen on prior exam from 5/10/2025 and appear slightly decreased in size.. A new focus of hypoattenuation however is seen posteriorly, compatible with splenic infarct (301-37). PANCREAS: Within normal limits. ADRENALS: Within normal limits. KIDNEYS/URETERS: Within normal limits.  BLADDER: Within normal limits. REPRODUCTIVE ORGANS: Normal uterus. Bilateral adnexal hypodense lesions measuring slightly greater than simple fluid density and previously characterized as endometriomas, measuring 5.3 x 4.6 cm on the right and 3.9 x 2.6 cm on the left, both of which are increased in size since prior exam from 3/25/2025.  BOWEL: No bowel obstruction. Appendix is not visualized. No evidence of inflammation in the pericecal region. Distal transverse and descending colon are underdistended which sends evaluation of the wall thickness. No inflammatory changes. PERITONEUM/RETROPERITONEUM: Within normal limits. VESSELS: Within normal limits. LYMPH NODES: No lymphadenopathy. ABDOMINAL WALL: Within normal limits. BONES: Within normal limits.  IMPRESSION: New splenic infarct  --- End of Report ---   Statement Selected